# Patient Record
Sex: FEMALE
[De-identification: names, ages, dates, MRNs, and addresses within clinical notes are randomized per-mention and may not be internally consistent; named-entity substitution may affect disease eponyms.]

---

## 2017-01-27 ENCOUNTER — RX ONLY (OUTPATIENT)
Age: 62
Setting detail: RX ONLY
End: 2017-01-27

## 2017-01-27 RX ORDER — LIDOCAINE, PRILOCAINE 25; 25 MG/G; MG/G
CREAM TOPICAL
Qty: 1 | Refills: 3 | Status: ERX | COMMUNITY
Start: 2017-01-27

## 2017-02-14 ENCOUNTER — APPOINTMENT (RX ONLY)
Dept: URBAN - METROPOLITAN AREA CLINIC 170 | Facility: CLINIC | Age: 62
Setting detail: DERMATOLOGY
End: 2017-02-14

## 2017-02-14 DIAGNOSIS — Z41.9 ENCOUNTER FOR PROCEDURE FOR PURPOSES OTHER THAN REMEDYING HEALTH STATE, UNSPECIFIED: ICD-10-CM

## 2017-02-14 NOTE — HPI: OTHER
Condition:: Filler/btx-a tx
Please Describe Your Condition:: no known contraindications to soft tissue augmentation with GABE; no known contraindications to treatment with botulinum toxin. See \"Cosmetic Procedure\" note in Attachments.

## 2017-05-25 ENCOUNTER — APPOINTMENT (RX ONLY)
Dept: URBAN - METROPOLITAN AREA CLINIC 170 | Facility: CLINIC | Age: 62
Setting detail: DERMATOLOGY
End: 2017-05-25

## 2017-05-25 DIAGNOSIS — Z41.9 ENCOUNTER FOR PROCEDURE FOR PURPOSES OTHER THAN REMEDYING HEALTH STATE, UNSPECIFIED: ICD-10-CM

## 2017-05-25 PROCEDURE — ? DYSPORT

## 2017-05-25 PROCEDURE — ? FILLERS

## 2017-05-25 PROCEDURE — ? BOTOX

## 2017-05-25 NOTE — PROCEDURE: BOTOX
Dilution (U/0.1 Cc): 4
Additional Area 5 Units: 0
Detail Level: Zone
Lot #: [id number]
Additional Area 1 Units: 50
Additional Area 1 Location: Face
Expiration Date (Month Year): 12/19
Consent: Written consent obtained. Risks include but not limited to lid/brow ptosis, bruising, swelling, diplopia, temporary effect, incomplete chemical denervation.

## 2017-05-25 NOTE — HPI: OTHER
Condition:: Filler/btx-a tx
Please Describe Your Condition:: no known contraindications to soft tissue augmentation with GABE; no known contraindications to treatment with botulinum toxin. See face sheet

## 2017-05-25 NOTE — PROCEDURE: DYSPORT
Anterior Platysmal Bands Units: 0
Lot #: [id number]
Detail Level: Zone
Dilution (U/ 0.1cc): 10
Expiration Date (Month Year): 09/17
Consent: Written consent obtained. Risks include but not limited to lid/brow ptosis, bruising, swelling, diplopia, temporary effect, incomplete chemical denervation.
Additional Area 1 Units: 60
Additional Area 1 Location: Neck bands

## 2017-05-25 NOTE — PROCEDURE: FILLERS
Lateral Face Filler  Volume In Cc: 0
Filler: Juvederm Volbella XC
Detail Level: Zone
Additional Area 1 Volume In Cc: 1
Anesthesia Type: 1% lidocaine without epinephrine
Consent: Written consent obtained. Risks include but not limited to bruising, beading, irregular texture, ulceration, infection, allergic reaction, scar formation, incomplete augmentation, temporary nature, procedural pain.
Additional Anesthesia Volume In Cc: 6
Lot #: [id number]
Expiration Date (Month Year): 06/19
Additional Area 1 Location: Face
Expiration Date (Month Year): 08/18
Expiration Date (Month Year): 02/19
Map Statment: See Attach Map for Complete Details
Filler: [place] Arreola
Lot #: [id number]
Post-Care Instructions: Patient instructed to apply ice to reduce swelling.
Filler: Juvederm Ultra Plus XC
Use Map Statement For Sites (Optional): No
Anesthesia Volume In Cc: 0.5
Lot #: [id number]

## 2017-06-01 ENCOUNTER — APPOINTMENT (RX ONLY)
Dept: URBAN - METROPOLITAN AREA CLINIC 170 | Facility: CLINIC | Age: 62
Setting detail: DERMATOLOGY
End: 2017-06-01

## 2017-06-01 DIAGNOSIS — Z029 ENCOUNTERS FOR UNSPECIFIED ADMINISTRATIVE PURPOSE: ICD-10-CM

## 2017-06-01 PROBLEM — Z02.9 ENCOUNTER FOR ADMINISTRATIVE EXAMINATIONS, UNSPECIFIED: Status: ACTIVE | Noted: 2017-06-01

## 2017-06-01 PROCEDURE — ? OTHER

## 2017-06-01 NOTE — PROCEDURE: OTHER
Other (Free Text): See \"Cosmetic Procedure\" note in Attachments. Hylenex as in procedure note after icg .  Consider consultation with oculoplastic surgeon regarding lower lids, festoons, etc.
Detail Level: Zone
Note Text (......Xxx Chief Complaint.): This diagnosis correlates with the

## 2017-06-01 NOTE — HPI: OTHER
Condition:: Cosmetic f/u - 5/25/17 filler/btx-a tx
Please Describe Your Condition:: See \"Cosmetic Procedure\" note in Attachments.

## 2018-07-31 ENCOUNTER — APPOINTMENT (RX ONLY)
Dept: URBAN - METROPOLITAN AREA CLINIC 170 | Facility: CLINIC | Age: 63
Setting detail: DERMATOLOGY
End: 2018-07-31

## 2018-07-31 DIAGNOSIS — Z41.9 ENCOUNTER FOR PROCEDURE FOR PURPOSES OTHER THAN REMEDYING HEALTH STATE, UNSPECIFIED: ICD-10-CM

## 2018-07-31 PROCEDURE — ? DYSPORT

## 2018-07-31 PROCEDURE — ? BOTOX

## 2018-07-31 PROCEDURE — ? FILLERS

## 2018-07-31 NOTE — PROCEDURE: DYSPORT
Additional Area 5 Units: 0
Detail Level: Zone
Additional Area 1 Units: 50
Consent: Written consent obtained. Risks include but not limited to lid/brow ptosis, bruising, swelling, diplopia, temporary effect, incomplete chemical denervation.
Additional Area 1 Location: Face
Expiration Date (Month Year): 02/19
Lot #: [id number]
Dilution (U/ 0.1cc): 10

## 2018-07-31 NOTE — PROCEDURE: FILLERS
Cheeks Filler Volume In Cc: 0
Expiration Date (Month Year): 05/19
Additional Area 1 Location: Face
Lot #: [id number]
Expiration Date (Month Year): 12/19
Use Map Statement For Sites (Optional): No
Additional Area 1 Volume In Cc: 1
Detail Level: Zone
Filler: Juvederm Volbella XC
Filler: Juvederm Ultra XC
Additional Area 1 Volume In Cc: 0.5
Anesthesia Type: 1% lidocaine without epinephrine
Additional Anesthesia Volume In Cc: 6
Post-Care Instructions: Patient instructed to apply ice to reduce swelling.
Map Statment: See Attach Map for Complete Details
Lot #: [id number]
Consent: Written consent obtained. Risks include but not limited to bruising, beading, irregular texture, ulceration, infection, allergic reaction, scar formation, incomplete augmentation, temporary nature, procedural pain.
Lot #: [id number]

## 2018-07-31 NOTE — PROCEDURE: BOTOX
Lateral Platysmal Bands Units: 0
Dilution (U/0.1 Cc): 4
Lot #: [id number]
Consent: Written consent obtained. Risks include but not limited to lid/brow ptosis, bruising, swelling, diplopia, temporary effect, incomplete chemical denervation.
Detail Level: Zone
Additional Area 1 Location: Face
Expiration Date (Month Year): 12/20
Additional Area 1 Units: 50

## 2018-07-31 NOTE — HPI: OTHER
Condition:: Filler/btx-a tx
Please Describe Your Condition:: comes in for Filler/btx-a tx . No known contraindications to soft tissue augmentation with GABE; no known contraindications to treatment with botulinum toxin. See “Cosmetic Procedure” note in Attachments.
Condition:: Hylenex treatment - face . Lot # [id number] ; Exp. 12/2019.
Please Describe Your Condition:: comes in for Hylenex treatment - face . Lot # [id number] ; Exp. 12/2019. . See “Cosmetic Procedure” note in Attachments.

## 2019-04-15 ENCOUNTER — RX ONLY (OUTPATIENT)
Age: 64
Setting detail: RX ONLY
End: 2019-04-15

## 2019-04-15 RX ORDER — LIDOCAINE AND PRILOCAINE CREAM 25; 25 MG/G; MG/G
CREAM TOPICAL
Qty: 1 | Refills: 3 | Status: ERX

## 2019-04-17 ENCOUNTER — APPOINTMENT (RX ONLY)
Dept: URBAN - METROPOLITAN AREA CLINIC 167 | Facility: CLINIC | Age: 64
Setting detail: DERMATOLOGY
End: 2019-04-17

## 2019-04-17 DIAGNOSIS — Z41.9 ENCOUNTER FOR PROCEDURE FOR PURPOSES OTHER THAN REMEDYING HEALTH STATE, UNSPECIFIED: ICD-10-CM

## 2019-04-17 PROCEDURE — ? FILLERS

## 2019-04-17 PROCEDURE — ? DYSPORT

## 2019-04-17 PROCEDURE — ? BOTOX

## 2019-04-17 NOTE — PROCEDURE: BOTOX
Nasal Root Units: 0
Lot #: [id number]
Consent: Written consent obtained. Risks include but not limited to lid/brow ptosis, bruising, swelling, diplopia, temporary effect, incomplete chemical denervation.
Expiration Date (Month Year): 09/21
Additional Area 1 Units: 70
Detail Level: Zone
Dilution (U/0.1 Cc): 4
Additional Area 1 Location: Face

## 2019-04-17 NOTE — PROCEDURE: FILLERS
Mid Face Filler  Volume In Cc: 0
Expiration Date (Month Year): 12/19
Include Cannula Information In Note?: No
Expiration Date (Month Year): 02/20
Expiration Date (Month Year): 01/20
Lot #: [id number]
Lot #: [id number]
Map Statment: See Attach Map for Complete Details
Lot #: [id number]
Anesthesia Type: 1% lidocaine without epinephrine
Filler: Juvederm Voluma XC
Anesthesia Volume In Cc: 0.5
Consent: Written consent obtained. Risks include but not limited to bruising, beading, irregular texture, ulceration, infection, allergic reaction, scar formation, incomplete augmentation, temporary nature, procedural pain.
Additional Area 1 Volume In Cc: 1
Post-Care Instructions: Patient instructed to apply ice to reduce swelling.
Additional Anesthesia Volume In Cc: 6
Additional Area 1 Location: Face
Filler: Juvederm Ultra Plus XC
Detail Level: Zone

## 2019-04-17 NOTE — HPI: OTHER
Condition:: Filler/btx-a tx
Please Describe Your Condition:: comes in for Filler/btx-a tx . No known contraindications to soft tissue augmentation with GABE; no known contraindications to treatment with botulinum toxin. See “Cosmetic Procedure” note in Attachments.

## 2019-04-17 NOTE — PROCEDURE: DYSPORT
Levator Labii Superioris Units: 0
Detail Level: Zone
Expiration Date (Month Year): 05/19
Additional Area 1 Units: 50
Dilution (U/ 0.1cc): 10
Consent: Written consent obtained. Risks include but not limited to lid/brow ptosis, bruising, swelling, diplopia, temporary effect, incomplete chemical denervation.
Additional Area 1 Location: Face and neck bands
Lot #: [id number]

## 2019-10-07 ENCOUNTER — RX ONLY (OUTPATIENT)
Age: 64
Setting detail: RX ONLY
End: 2019-10-07

## 2019-10-07 ENCOUNTER — APPOINTMENT (RX ONLY)
Dept: URBAN - METROPOLITAN AREA CLINIC 167 | Facility: CLINIC | Age: 64
Setting detail: DERMATOLOGY
End: 2019-10-07

## 2019-10-07 DIAGNOSIS — Z41.9 ENCOUNTER FOR PROCEDURE FOR PURPOSES OTHER THAN REMEDYING HEALTH STATE, UNSPECIFIED: ICD-10-CM

## 2019-10-07 PROCEDURE — ? DYSPORT

## 2019-10-07 PROCEDURE — ? BOTOX

## 2019-10-07 PROCEDURE — ? FILLERS

## 2019-10-07 RX ORDER — TRETINOIN 0.5 MG/G
CREAM TOPICAL
Qty: 1 | Refills: 3 | COMMUNITY
Start: 2019-10-07

## 2019-10-07 NOTE — PROCEDURE: FILLERS
Lot #: [id number]
Anesthesia Type: 1% lidocaine without epinephrine
Tear Troughs Filler  Volume In Cc: 0
Lot #: [id number]
Include Cannula Information In Note?: No
Expiration Date (Month Year): 01/20
Anesthesia Volume In Cc: 0.5
Expiration Date (Month Year): 8/14/20
Expiration Date (Month Year): 2/28/21
Additional Area 1 Location: Face
Post-Care Instructions: Patient instructed to apply ice to reduce swelling.
Consent: Written consent obtained. Risks include but not limited to bruising, beading, irregular texture, ulceration, infection, allergic reaction, scar formation, incomplete augmentation, temporary nature, procedural pain.
Additional Area 1 Volume In Cc: 1
Additional Anesthesia Volume In Cc: 6
Filler: Restylane Defyne
Detail Level: Zone
Filler: Juvederm Ultra Plus XC
Map Statment: See Attach Map for Complete Details
Lot #: [id number]

## 2019-10-07 NOTE — PROCEDURE: DYSPORT
Lateral Platysmal Bands Units: 0
Consent: Written consent obtained. Risks include but not limited to lid/brow ptosis, bruising, swelling, diplopia, temporary effect, incomplete chemical denervation.
Additional Area 1 Units: 50
Dilution (U/ 0.1cc): 10
Expiration Date (Month Year): 03/20
Detail Level: Zone
Lot #: [id number]
Additional Area 1 Location: Face and neck bands

## 2020-02-12 ENCOUNTER — APPOINTMENT (RX ONLY)
Dept: URBAN - METROPOLITAN AREA CLINIC 173 | Facility: CLINIC | Age: 65
Setting detail: DERMATOLOGY
End: 2020-02-12

## 2020-02-12 DIAGNOSIS — Z41.9 ENCOUNTER FOR PROCEDURE FOR PURPOSES OTHER THAN REMEDYING HEALTH STATE, UNSPECIFIED: ICD-10-CM

## 2020-02-12 PROCEDURE — ? FILLERS

## 2020-02-12 PROCEDURE — ? BOTOX

## 2020-02-12 PROCEDURE — ? DYSPORT

## 2020-02-12 NOTE — PROCEDURE: DYSPORT
Levator Labii Superioris Units: 0
Lot #: [id number]
Dilution (U/ 0.1cc): 10
Additional Area 1 Units: 50
Expiration Date (Month Year): 7/20
Consent: Written consent obtained. Risks include but not limited to lid/brow ptosis, bruising, swelling, diplopia, temporary effect, incomplete chemical denervation.
Detail Level: Zone
Additional Area 1 Location: face and neck bands

## 2020-02-12 NOTE — PROCEDURE: FILLERS
Additional Anesthesia Volume In Cc: 6
Dorsal Hands Filler  Volume In Cc: 0
Additional Area 1 Location: Face
Consent: Written consent obtained. Risks include but not limited to bruising, beading, irregular texture, ulceration, infection, allergic reaction, scar formation, incomplete augmentation, temporary nature, procedural pain.
Post-Care Instructions: Patient instructed to apply ice to reduce swelling.
Lot #: [id number]
Filler: [place] Arreola
Lot #: [id number] *JORDI Special *
Detail Level: Zone
Lot #: [id number]
Include Cannula Information In Note?: No
Expiration Date (Month Year): 01/20
Expiration Date (Month Year): 8/31/22
Expiration Date (Month Year): 12/19
Map Statment: See Attach Map for Complete Details
Anesthesia Type: 1% lidocaine without epinephrine
Anesthesia Volume In Cc: 0.5
Additional Area 1 Volume In Cc: 2

## 2020-02-12 NOTE — HPI: OTHER
Condition:: Filler/ btx-a tx
Please Describe Your Condition:: comes in for Filler/ btx-a tx . No known contraindications to soft tissue augmentation with GABE; no known contraindications to treatment with botulinum toxin. See “Cosmetic Procedure” note in Attachments.

## 2020-02-12 NOTE — PROCEDURE: BOTOX
[facility] Units: 0
Show Additional Area 4: Yes
Show Mentalis Units: No
Expiration Date (Month Year): 8/22
Additional Area 1 Units: 55
Detail Level: Zone
Lot #: [id number]
Consent: Written consent obtained. Risks include but not limited to lid/brow ptosis, bruising, swelling, diplopia, temporary effect, incomplete chemical denervation.
Dilution (U/0.1 Cc): 4
Additional Area 1 Location: Face

## 2020-10-09 ENCOUNTER — RX ONLY (OUTPATIENT)
Age: 65
Setting detail: RX ONLY
End: 2020-10-09

## 2020-10-09 RX ORDER — LIDOCAINE AND PRILOCAINE CREAM 25; 25 MG/G; MG/G
CREAM TOPICAL
Qty: 1 | Refills: 3 | Status: ERX

## 2020-10-13 ENCOUNTER — APPOINTMENT (RX ONLY)
Dept: URBAN - METROPOLITAN AREA CLINIC 173 | Facility: CLINIC | Age: 65
Setting detail: DERMATOLOGY
End: 2020-10-13

## 2020-10-13 DIAGNOSIS — Z41.9 ENCOUNTER FOR PROCEDURE FOR PURPOSES OTHER THAN REMEDYING HEALTH STATE, UNSPECIFIED: ICD-10-CM

## 2020-10-13 PROCEDURE — ? BOTOX

## 2020-10-13 PROCEDURE — ? FILLERS

## 2020-10-13 PROCEDURE — ? DYSPORT

## 2020-10-13 NOTE — PROCEDURE: DYSPORT
Show Depressor Anguli Units: Yes
L Brow Units: 0
Detail Level: Zone
Lot #: [id number]
Dilution (U/ 0.1cc): 10
Show Ucl Units: No
Expiration Date (Month Year): 06/21
Consent: Written consent obtained. Risks include but not limited to lid/brow ptosis, bruising, swelling, diplopia, temporary effect, incomplete chemical denervation.
Additional Area 1 Location: neck bands
Additional Area 1 Units: 50

## 2020-10-13 NOTE — PROCEDURE: FILLERS
Cheeks Filler Volume In Cc: 0
Filler: [place] Arreola
Use Map Statement For Sites (Optional): No
Map Statment: See Attach Map for Complete Details
Lot #: [id number]
Lot #: [id number]
Expiration Date (Month Year): 11/21
Anesthesia Type: 1% lidocaine without epinephrine
Lot #: [id number]
Expiration Date (Month Year): 01/23
Expiration Date (Month Year): 01/20
Anesthesia Volume In Cc: 0.5
Additional Anesthesia Volume In Cc: 6
Additional Area 1 Location: Face
Additional Area 1 Volume In Cc: 1
Consent: Written consent obtained. Risks include but not limited to bruising, beading, irregular texture, ulceration, infection, allergic reaction, scar formation, incomplete augmentation, temporary nature, procedural pain.
Post-Care Instructions: Patient instructed to apply ice to reduce swelling.
Detail Level: Zone
Filler: Juvederm Voluma XC

## 2020-10-13 NOTE — HPI: OTHER
Condition:: Filler/btx-a tx
Please Describe Your Condition:: is being seen for Filler/btx-a tx . No known contraindications to soft tissue augmentation with GABE; no known contraindications to treatment with botulinum toxin. See “Cosmetic Procedure”note in Attachments.

## 2020-10-13 NOTE — PROCEDURE: BOTOX
Lateral Platysmal Bands Units: 0
Show Additional Area 2: Yes
Consent: Written consent obtained. Risks include but not limited to lid/brow ptosis, bruising, swelling, diplopia, temporary effect, incomplete chemical denervation.
Show Ucl Units: No
Lot #: [id number]
Additional Area 1 Location: Face
Dilution (U/0.1 Cc): 4
Expiration Date (Month Year): 05/23
Detail Level: Zone
Additional Area 1 Units: 55

## 2021-05-08 NOTE — PROCEDURE: BOTOX
Additional Area 1 Units: 55
Glabellar Complex Units: 0
Show Orbicularis Oculi Units: Yes
Expiration Date (Month Year): 02/22
Show Right And Left Pupillary Line Units: No
Consent: Written consent obtained. Risks include but not limited to lid/brow ptosis, bruising, swelling, diplopia, temporary effect, incomplete chemical denervation.
Lot #: [id number]
Dilution (U/0.1 Cc): 4
Detail Level: Zone
Additional Area 1 Location: Face
PROVIDER:[TOKEN:[[id number]]]

## 2021-06-28 ENCOUNTER — RX ONLY (OUTPATIENT)
Age: 66
Setting detail: RX ONLY
End: 2021-06-28

## 2021-06-28 ENCOUNTER — APPOINTMENT (RX ONLY)
Dept: URBAN - METROPOLITAN AREA CLINIC 173 | Facility: CLINIC | Age: 66
Setting detail: DERMATOLOGY
End: 2021-06-28

## 2021-06-28 DIAGNOSIS — Z41.9 ENCOUNTER FOR PROCEDURE FOR PURPOSES OTHER THAN REMEDYING HEALTH STATE, UNSPECIFIED: ICD-10-CM

## 2021-06-28 PROCEDURE — ? DYSPORT

## 2021-06-28 PROCEDURE — ? BOTOX

## 2021-06-28 PROCEDURE — ? FILLERS

## 2021-06-28 PROCEDURE — ? HYALURONIDASE INJECTION

## 2021-06-28 RX ORDER — TRETINOIN 0.5 MG/G
CREAM TOPICAL
Qty: 1 | Refills: 3 | Status: ERX

## 2021-06-28 ASSESSMENT — LOCATION DETAILED DESCRIPTION DERM
LOCATION DETAILED: RIGHT LATERAL INFERIOR EYELID
LOCATION DETAILED: LEFT LATERAL INFERIOR EYELID

## 2021-06-28 ASSESSMENT — LOCATION ZONE DERM: LOCATION ZONE: EYELID

## 2021-06-28 ASSESSMENT — LOCATION SIMPLE DESCRIPTION DERM
LOCATION SIMPLE: LEFT INFERIOR EYELID
LOCATION SIMPLE: RIGHT INFERIOR EYELID

## 2021-06-28 NOTE — PROCEDURE: DYSPORT
Show Nasal Units: Yes
Left Pupillary Line Units: 0
Show Mentalis Units: No
Lot #: [id number]
Dilution (U/ 0.1cc): 10
Consent: Written consent obtained. Risks include but not limited to lid/brow ptosis, bruising, swelling, diplopia, temporary effect, incomplete chemical denervation.
Additional Area 1 Location: Face and neckbands
Additional Area 1 Units: 50
Expiration Date (Month Year): 01/22
Detail Level: Zone

## 2021-06-28 NOTE — HPI: OTHER
Condition:: Filler/btx-a tx
Please Describe Your Condition:: is being seen for Filler/btx-a tx . No known contraindications to soft tissue augmentation with GABE; no known contraindications to treatment with botulinum toxin. See “Cosmetic Procedure”note in Attachments.
Condition:: Hylenex treatment
Please Describe Your Condition:: is being seen for a Hylenex treatment. Lower eyelid swelling. Lot#- [id number], Exp. 09/2021. See “Cosmetic Procedure”note in Attachments.

## 2021-06-28 NOTE — PROCEDURE: FILLERS
Mid Face Filler  Volume In Cc: 0
Include Cannula Information In Note?: No
Anesthesia Volume In Cc: 0.5
Expiration Date (Month Year): 11/21
Additional Anesthesia Volume In Cc: 6
Additional Area 1 Location: Face
Additional Area 1 Volume In Cc: 2
Lot #: [id number]
Consent: Written consent obtained. Risks include but not limited to bruising, beading, irregular texture, ulceration, infection, allergic reaction, scar formation, incomplete augmentation, temporary nature, procedural pain.
Additional Area 1 Volume In Cc: 1
Detail Level: Zone
Expiration Date (Month Year): 06/23
Post-Care Instructions: Patient instructed to apply ice to reduce swelling.
Filler: Juvederm Voluma XC
Filler: Restylane Defyne
Filler: RHA 3
Map Statment: See Attach Map for Complete Details
Lot #: [id number]
Anesthesia Type: 1% lidocaine without epinephrine
Expiration Date (Month Year): 04/22
Lot #: [id number]

## 2021-06-28 NOTE — PROCEDURE: HYALURONIDASE INJECTION
Explained Holter monitor and diary.
Consent: The risks of contour defects and dimpling of the skin were reviewed with the patient prior to the injection.
Detail Level: Zone
Hyaluronidase Preparation: hyaluronidase
Total Volume (Ccs): 20
Lot # (Optional): [id number]
Expiration Date (Optional): 09/2021

## 2021-06-28 NOTE — PROCEDURE: BOTOX
Show Levator Superior Units: Yes
Inferior Lateral Orbicularis Oculi Units: 0
Detail Level: Zone
Lot #: [id number]
Show Ucl Units: No
Consent: Written consent obtained. Risks include but not limited to lid/brow ptosis, bruising, swelling, diplopia, temporary effect, incomplete chemical denervation.
Dilution (U/0.1 Cc): 4
Additional Area 1 Location: Face
Additional Area 1 Units: 55
Expiration Date (Month Year): 09/23

## 2021-10-12 ENCOUNTER — APPOINTMENT (RX ONLY)
Dept: URBAN - METROPOLITAN AREA CLINIC 173 | Facility: CLINIC | Age: 66
Setting detail: DERMATOLOGY
End: 2021-10-12

## 2021-10-12 DIAGNOSIS — Z41.9 ENCOUNTER FOR PROCEDURE FOR PURPOSES OTHER THAN REMEDYING HEALTH STATE, UNSPECIFIED: ICD-10-CM

## 2021-10-12 PROCEDURE — ? FILLERS

## 2021-10-12 PROCEDURE — ? BOTOX

## 2021-10-12 PROCEDURE — ? DYSPORT

## 2021-10-12 NOTE — PROCEDURE: FILLERS
[place] Filler  Volume In Cc: 0
Include Cannula Information In Note?: No
Additional Area 1 Location: Face
Lot #: [id number]
Additional Area 1 Volume In Cc: 1
Detail Level: Zone
Expiration Date (Month Year): 11/21
Lot #: [id number]
Filler: Juvederm Voluma XC
Filler: Restylane Defyne
Map Statment: See Attach Map for Complete Details
Lot #: [id number] *JORDI special*
Consent: Written consent obtained. Risks include but not limited to bruising, beading, irregular texture, ulceration, infection, allergic reaction, scar formation, incomplete augmentation, temporary nature, procedural pain.
Post-Care Instructions: Patient instructed to apply ice to reduce swelling.
Anesthesia Type: 1% lidocaine without epinephrine
Expiration Date (Month Year): 02/23
Anesthesia Volume In Cc: 0.5
Expiration Date (Month Year): 10/22
Additional Anesthesia Volume In Cc: 6

## 2021-10-12 NOTE — PROCEDURE: BOTOX
Additional Area 6 Units: 0
Detail Level: Zone
Show Additional Area 2: Yes
Show Mentalis Units: No
Lot #: [id number]
Consent: Written consent obtained. Risks include but not limited to lid/brow ptosis, bruising, swelling, diplopia, temporary effect, incomplete chemical denervation.
Additional Area 1 Location: Face
Dilution (U/0.1 Cc): 4
Expiration Date (Month Year): 01/24
Additional Area 1 Units: 55

## 2021-10-12 NOTE — PROCEDURE: DYSPORT
Show Additional Area 3: Yes
Detail Level: Zone
Lcl Root Units: 0
Expiration Date (Month Year): 02/22
Show Right And Left Brow Units: No
Additional Area 1 Units: 50
Consent: Written consent obtained. Risks include but not limited to lid/brow ptosis, bruising, swelling, diplopia, temporary effect, incomplete chemical denervation.
Lot #: [id number]
Additional Area 1 Location: neckbands
Dilution (U/ 0.1cc): 10

## 2022-02-14 ENCOUNTER — APPOINTMENT (RX ONLY)
Dept: URBAN - METROPOLITAN AREA CLINIC 173 | Facility: CLINIC | Age: 67
Setting detail: DERMATOLOGY
End: 2022-02-14

## 2022-02-14 DIAGNOSIS — Z41.9 ENCOUNTER FOR PROCEDURE FOR PURPOSES OTHER THAN REMEDYING HEALTH STATE, UNSPECIFIED: ICD-10-CM

## 2022-02-14 PROCEDURE — ? FILLERS

## 2022-02-14 PROCEDURE — ? DYSPORT

## 2022-02-14 PROCEDURE — ? BOTOX

## 2022-02-14 PROCEDURE — ? HYALURONIDASE INJECTION

## 2022-02-14 ASSESSMENT — LOCATION DETAILED DESCRIPTION DERM
LOCATION DETAILED: LEFT LATERAL INFERIOR EYELID
LOCATION DETAILED: RIGHT LATERAL INFERIOR EYELID
LOCATION DETAILED: LEFT LATERAL SUPERIOR EYELID
LOCATION DETAILED: RIGHT LATERAL SUPERIOR EYELID

## 2022-02-14 ASSESSMENT — LOCATION SIMPLE DESCRIPTION DERM
LOCATION SIMPLE: LEFT SUPERIOR EYELID
LOCATION SIMPLE: LEFT INFERIOR EYELID
LOCATION SIMPLE: RIGHT SUPERIOR EYELID
LOCATION SIMPLE: RIGHT INFERIOR EYELID

## 2022-02-14 ASSESSMENT — LOCATION ZONE DERM: LOCATION ZONE: EYELID

## 2022-02-14 NOTE — PROCEDURE: FILLERS
Unknown
Dorsal Hands Filler  Volume In Cc: 0
Additional Area 1 Location: Face
Use Map Statement For Sites (Optional): No
Additional Area 1 Volume In Cc: 1
Map Statment: See Attach Map for Complete Details
Filler: Juvederm Voluma XC
Anesthesia Type: 1% lidocaine without epinephrine
Consent: Written consent obtained. Risks include but not limited to bruising, beading, irregular texture, ulceration, infection, allergic reaction, scar formation, incomplete augmentation, temporary nature, procedural pain.
Post-Care Instructions: Patient instructed to apply ice to reduce swelling.
Anesthesia Volume In Cc: 0.5
Lot #: [id number]
Lot #: [id number] *JORDI Special *
Expiration Date (Month Year): 09/22
Additional Anesthesia Volume In Cc: 6
Lot #: [id number]
Expiration Date (Month Year): 06/22
Additional Area 1 Volume In Cc: 2
Expiration Date (Month Year): 01/20
Filler: Restylane Defyne
Detail Level: Zone

## 2022-02-14 NOTE — PROCEDURE: BOTOX
Anterior Platysmal Bands Units: 0
Show Depressor Anguli Units: Yes
Show Right And Left Brow Units: No
Detail Level: Zone
Lot #: [id number]
Expiration Date (Month Year): 04/24
Additional Area 1 Location: Face
Dilution (U/0.1 Cc): 4
Consent: Written consent obtained. Risks include but not limited to lid/brow ptosis, bruising, swelling, diplopia, temporary effect, incomplete chemical denervation.
Additional Area 1 Units: 55

## 2022-02-14 NOTE — PROCEDURE: HYALURONIDASE INJECTION
Detail Level: Zone
Lot # (Optional): [id number]
Total Volume (Ccs): 0.2
Hyaluronidase Preparation: hyaluronidase
Consent: The risks of contour defects and dimpling of the skin were reviewed with the patient prior to the injection.
Expiration Date (Optional): 04/2024

## 2022-02-14 NOTE — PROCEDURE: DYSPORT
Show Right And Left Pupillary Line Units: No
Right Periorbital Units: 0
Show Additional Area 6: Yes
Expiration Date (Month Year): 09/22
Lot #: [id number]
Additional Area 1 Location: Face and neckbands
Detail Level: Zone
Additional Area 1 Units: 50
Dilution (U/ 0.1cc): 10
Consent: Written consent obtained. Risks include but not limited to lid/brow ptosis, bruising, swelling, diplopia, temporary effect, incomplete chemical denervation.

## 2022-03-02 ENCOUNTER — RX ONLY (OUTPATIENT)
Age: 67
Setting detail: RX ONLY
End: 2022-03-02

## 2022-03-02 RX ORDER — TRETINOIN 1 MG/G
CREAM TOPICAL
Qty: 45 | Refills: 4 | Status: ERX | COMMUNITY
Start: 2022-03-02